# Patient Record
Sex: MALE | Race: WHITE | Employment: FULL TIME | URBAN - METROPOLITAN AREA
[De-identification: names, ages, dates, MRNs, and addresses within clinical notes are randomized per-mention and may not be internally consistent; named-entity substitution may affect disease eponyms.]

---

## 2017-10-11 ENCOUNTER — TELEPHONE (OUTPATIENT)
Dept: INTERNAL MEDICINE | Facility: CLINIC | Age: 57
End: 2017-10-11

## 2018-07-20 ENCOUNTER — HOSPITAL ENCOUNTER (OUTPATIENT)
Facility: CLINIC | Age: 58
End: 2018-07-20
Attending: SURGERY | Admitting: SURGERY
Payer: COMMERCIAL

## 2018-08-30 ENCOUNTER — SURGERY (OUTPATIENT)
Age: 58
End: 2018-08-30

## 2018-08-30 ENCOUNTER — HOSPITAL ENCOUNTER (OUTPATIENT)
Facility: CLINIC | Age: 58
Discharge: HOME | End: 2018-08-30
Attending: INTERNAL MEDICINE | Admitting: INTERNAL MEDICINE
Payer: COMMERCIAL

## 2018-08-30 VITALS
RESPIRATION RATE: 14 BRPM | OXYGEN SATURATION: 94 % | DIASTOLIC BLOOD PRESSURE: 79 MMHG | HEIGHT: 71 IN | SYSTOLIC BLOOD PRESSURE: 111 MMHG | BODY MASS INDEX: 28 KG/M2 | WEIGHT: 200 LBS

## 2018-08-30 LAB — COLONOSCOPY: NORMAL

## 2018-08-30 PROCEDURE — 88305 TISSUE EXAM BY PATHOLOGIST: CPT | Mod: 26 | Performed by: INTERNAL MEDICINE

## 2018-08-30 PROCEDURE — 25000128 H RX IP 250 OP 636: Performed by: INTERNAL MEDICINE

## 2018-08-30 PROCEDURE — 45380 COLONOSCOPY AND BIOPSY: CPT | Mod: PT | Performed by: INTERNAL MEDICINE

## 2018-08-30 PROCEDURE — G0500 MOD SEDAT ENDO SERVICE >5YRS: HCPCS | Performed by: INTERNAL MEDICINE

## 2018-08-30 PROCEDURE — 88305 TISSUE EXAM BY PATHOLOGIST: CPT | Performed by: INTERNAL MEDICINE

## 2018-08-30 RX ORDER — FENTANYL CITRATE 50 UG/ML
INJECTION, SOLUTION INTRAMUSCULAR; INTRAVENOUS PRN
Status: DISCONTINUED | OUTPATIENT
Start: 2018-08-30 | End: 2018-08-30 | Stop reason: HOSPADM

## 2018-08-30 RX ORDER — AMOXICILLIN 500 MG
1200 CAPSULE ORAL DAILY
COMMUNITY

## 2018-08-30 RX ORDER — ONDANSETRON 2 MG/ML
4 INJECTION INTRAMUSCULAR; INTRAVENOUS
Status: DISCONTINUED | OUTPATIENT
Start: 2018-08-30 | End: 2018-08-30 | Stop reason: HOSPADM

## 2018-08-30 RX ORDER — LIDOCAINE 40 MG/G
CREAM TOPICAL
Status: DISCONTINUED | OUTPATIENT
Start: 2018-08-30 | End: 2018-08-30 | Stop reason: HOSPADM

## 2018-08-30 RX ORDER — CYANOCOBALAMIN 1000 UG/ML
1 INJECTION, SOLUTION INTRAMUSCULAR; SUBCUTANEOUS
COMMUNITY
End: 2018-09-13

## 2018-08-30 RX ADMIN — FENTANYL CITRATE 100 MCG: 50 INJECTION, SOLUTION INTRAMUSCULAR; INTRAVENOUS at 09:49

## 2018-08-30 RX ADMIN — MIDAZOLAM 2 MG: 1 INJECTION INTRAMUSCULAR; INTRAVENOUS at 09:51

## 2018-08-30 RX ADMIN — MIDAZOLAM 2 MG: 1 INJECTION INTRAMUSCULAR; INTRAVENOUS at 09:50

## 2018-08-31 LAB — COPATH REPORT: NORMAL

## 2018-09-13 ENCOUNTER — OFFICE VISIT (OUTPATIENT)
Dept: INTERNAL MEDICINE | Facility: CLINIC | Age: 58
End: 2018-09-13
Payer: COMMERCIAL

## 2018-09-13 VITALS
TEMPERATURE: 97.8 F | HEART RATE: 67 BPM | DIASTOLIC BLOOD PRESSURE: 82 MMHG | SYSTOLIC BLOOD PRESSURE: 132 MMHG | BODY MASS INDEX: 28.59 KG/M2 | WEIGHT: 205 LBS | OXYGEN SATURATION: 98 % | RESPIRATION RATE: 16 BRPM

## 2018-09-13 DIAGNOSIS — R07.89 CHEST DISCOMFORT: ICD-10-CM

## 2018-09-13 DIAGNOSIS — Z13.6 CARDIOVASCULAR SCREENING; LDL GOAL LESS THAN 160: ICD-10-CM

## 2018-09-13 DIAGNOSIS — Z12.5 SPECIAL SCREENING FOR MALIGNANT NEOPLASM OF PROSTATE: ICD-10-CM

## 2018-09-13 DIAGNOSIS — R10.13 DYSPEPSIA: Primary | ICD-10-CM

## 2018-09-13 PROCEDURE — 99214 OFFICE O/P EST MOD 30 MIN: CPT | Performed by: INTERNAL MEDICINE

## 2018-09-13 PROCEDURE — 93000 ELECTROCARDIOGRAM COMPLETE: CPT | Performed by: INTERNAL MEDICINE

## 2018-09-13 RX ORDER — CHOLECALCIFEROL (VITAMIN D3) 50 MCG
2000 TABLET ORAL DAILY
COMMUNITY
Start: 2018-09-13

## 2018-09-13 NOTE — PROGRESS NOTES
SUBJECTIVE:   Artis Kuhn is a 58 year old male who presents to clinic today for the following health issues:      Chest Pain      Onset: 3-4 weeks, 6 or so occurances    Description (location/character/radiation/duration): describes as tightness in chest    Intensity:  Lasts for 15 second, describes as discomfort    Never happens during the daytime or when active., always happens when laying down, or when getting up in the morning.     Episodes never last more than 15-30 seconds.      No nausea, no vomiting.     Goes way after getting up and moving, possibly after drinking glass of water, althoguh mostly gone before getting to the sink.     No change sin exercise capacity or abilities.         Accompanying signs and symptoms:        Shortness of breath: YES       Sweating: no        Nausea/vomitting: YES - nausea       Palpitations: no        Other (fevers/chills/cough/heartburn/lightheadedness): YES- lightheadedness. Typically happens at morning or at night after eating, seems to be after drinks/eats dairy. Also has some burning sensation in RUQ of abd    History (similar episodes/previous evaluation): None    Precipitating or alleviating factors:       Worse with exertion: no        Worse with breathing: unknown       Related to eating: YES       Better with burping: no     Therapies tried and outcome: ASA 81 with some relief          Problem list and histories reviewed & adjusted, as indicated.  Additional history: as documented        Reviewed and updated as needed this visit by clinical staff  Tobacco  Allergies  Meds       Reviewed and updated as needed this visit by Provider           Past Medical History:  ---------------------------  Past Medical History:   Diagnosis Date     Obesity 4/27/15    BMI 31       Past Surgical History:  ---------------------------  Past Surgical History:   Procedure Laterality Date     C APPENDECTOMY  1977     COLONOSCOPY N/A 8/30/2018    Procedure: COMBINED  COLONOSCOPY, SINGLE OR MULTIPLE BIOPSY/POLYPECTOMY BY BIOPSY;  colonoscopy;  Surgeon: Issa Soto MD;  Location:  GI     HC AMPUTATE FINGER/THUMB,SINGLE W/WO INTER TFR  1993     HC REPAIR HERNIA WITH MESH  1998       Current Medications:  ---------------------------  Current Outpatient Prescriptions   Medication Sig Dispense Refill     Ascorbic Acid (VITAMIN C PO)        Cyanocobalamin (VITAMIN B 12 PO) Take 1,000 mcg by mouth daily       Omega-3 Fatty Acids (FISH OIL) 1200 MG capsule Take 1,200 mg by mouth daily       VITAMIN D, CHOLECALCIFEROL, PO Take by mouth daily         Allergies:  -------------  Allergies   Allergen Reactions     Carisoprodol Anaphylaxis     soma       Social History:  -------------------  Social History     Social History     Marital status:      Spouse name: N/A     Number of children: N/A     Years of education: N/A     Occupational History     Not on file.     Social History Main Topics     Smoking status: Former Smoker     Years: 10.00     Types: Cigarettes     Quit date: 7/17/1999     Smokeless tobacco: Former User     Quit date: 4/27/2007     Alcohol use Yes      Comment: 2 drinks per 6 month     Drug use: No     Sexual activity: Yes     Partners: Female     Other Topics Concern     Not on file     Social History Narrative       Family Medical History:  ------------------------------  Family History   Problem Relation Age of Onset     Diabetes Mother      Obesity Mother      Circulatory Mother      hx blood clots     Family History Negative Father      healthy     Family History Negative Brother      healthy     Diabetes Sister      hx hepatitis c , overweight         ROS:  REVIEW OF SYSTEMS:    RESP: negative for cough, dyspnea, wheezing, hemoptysis  CV: negative for palpitations, PND, SAM, orthopnea; reports no changes in their ability to perform physical activity (from cardiovascular standpoint)  GI: negative for dysphagia, N/V, pain, melena, diarrhea and  constipation  NEURO: negative for numbness/tingling, paralysis, incoordination, or focal weakness     OBJECTIVE:                                                    /82  Pulse 67  Temp 97.8  F (36.6  C) (Oral)  Resp 16  Wt 205 lb (93 kg)  SpO2 98%  BMI 28.59 kg/m2     GENERAL alert and no distress  EYES:  Normal sclera,conjunctiva, EOMI  HENT: oral and posterior pharynx without lesions or erythema, facies symmetric  NECK: Neck supple. No LAD, without thyroidmegaly or JVD., Carotids without bruits.  RESP: Clear to ausculation bilaterally without wheezes or crackles. Normal BS in all fields.  CV: RRR normal S1S2 without murmurs, rubs or gallops. PMI normal  LYMPH: no cervical lymph adenopathy appreciated  MS: extremities- no gross deformities of the visible extremities noted, no edema  PSYCH: Alert and oriented times 3; speech- coherent  SKIN:  No obvious significant skin lesions on visible portions of face     EKG:  normal sinus rhythm, no ischmie cchanges.      ASSESSMENT/PLAN:                                                      (R10.13) Dyspepsia  (primary encounter diagnosis)  Comment:   Plan: EKG 12-lead complete w/read - Clinics            *  Most likely dyspepsia, acid reflux causing intermittent esophageal spasm.  The episode do not last long enough to likely be heart related.     *  Check for anything related to eating that could be influencing your symptoms, I.e. Watch what you eat, when you eat, how much you eat as these all can cause stomach and esophageal problems.     *  Avoid any mints because this can cause easier acid reflux.     *  Take Prilosec (omeprazole) 20 mg once per day for 2 weeks.      *  If your symptoms do not get noticably better with these interventions or if things worsen in any way, then please let me know    *  Return to see me in 2-3 months for a routine physical, sooner if needed.  Please get fasting labs done at the [facility] or any other [facility]  Lab lab 1-2 days before this appointment.  If you get the labs done at another [facility], make arrangements with them directly.  The orders will be in place.  Eat nothing for at least 8 hours prior to having these labs drawn.  Call [phone] to schedule appointments at [facility].     (X77.47) Chest discomfort  Comment:   Plan: EKG 12-lead complete w/read - Clinics            (Z12.5) Special screening for malignant neoplasm of prostate  Comment:   Plan: **Prostate spec antigen screen FUTURE 1yr            (Z13.6) CARDIOVASCULAR SCREENING; LDL GOAL LESS THAN 160  Comment: Discussed cardiac disease risk factors and cardiac disease risk factor modification.   Plan: Lipid panel reflex to direct LDL Fasting, **CBC        with platelets FUTURE 6mo, **Basic metabolic         panel FUTURE 6mo               See Patient Instructions    BREE HERRERA M.D., MD  [facility]

## 2018-09-13 NOTE — PATIENT INSTRUCTIONS
"*  Most likely dyspepsia, acid reflux causing intermittent esophageal spasm.  The episode do not last long enough to likely be heart related.     *  Check for anything related to eating that could be influencing your symptoms, I.e. Watch what you eat, when you eat, how much you eat as these all can cause stomach and esophageal problems.     *  Avoid any mints because this can cause easier acid reflux.     *  Take Prilosec (omeprazole) 20 mg once per day for 2 weeks.      *  If your symptoms do not get noticably better with these interventions or if things worsen in any way, then please let me know    *  Return to see me in 2-3 months for a routine physical, sooner if needed.  Please get fasting labs done at the [facility] or any other [facility] Lab lab 1-2 days before this appointment.  If you get the labs done at another [facility] [place], make arrangements with them directly.  The orders will be in place.  Eat nothing for at least 8 hours prior to having these labs drawn.  Call [phone] to schedule appointments at [facility].     Gastroesophageal reflux (GERD)/Heartburn:     *  Your symptoms are due to acid from the stomach going up into the esophagus and causing irritation.     *  Anti reflux measures:     --Avoid meals within 3 hours of bedtime.     --Consider using a \"wedge pillow\" to elevated your head while sleeping.      Wedge pillow:  Can get these at any medical supply store or [employer]        *  Many times, this condition is caused by what you eat, when you eat, how you eat, how much you eat.    If there are foods, food combinations or eating patterns that cause your symptoms to be worse, then alter these things to see if things improve.     * Specifically avoid any specific foods that cause problems, such as tomato sauces/onions/peppermints/spearmint.    Avoid OTC medications such as Aspirin , Ibuprofen or Aleve which may cause stomach irritation.     *  Trial of " "Omeprazole (prilosec) either prescription or over the counter version, 20 mg once per day for the next 2 weeks.    *  Try Simethicone (Gas-x, Phazyme, Riopan Plus, Maalox Plus, etc.) as needed for gas and bloating.    If these are not helpful and you continue to have symptoms, then we may want to try prescription medications such as Nexium or Prevacid if these types of medications are covered by your insurance.    Some insurance plans will not cover such prescription medications for stomach acid now that Prilosec is available over the counter.  Some insurance plans may cover these medications if you try OTC Prilosec and it does not work.    *  If you are still having troubles despite this medication, then return to see me, we may need to consider further testing.    *  If the medication works well, then continue it.  Call me to let me know and I can send further presriptions to the pharmacy.             --Good Grains:  Oats, brown rice, Quinoa (these do not raise the blood sugar as much)     --Bad grains:  Anything made from wheat or white rice     (because these raise the blood sugars significantly, and the possible gluten issue from wheat for some people).      --Proteins:  Aim for \"lean proteins\" including chicken, fish, seafood, pork, turkey, and eggs (in moderation); Eat red meat only occasionally      Gennaro Tejada:                    "

## 2018-09-13 NOTE — MR AVS SNAPSHOT
"              After Visit Summary   9/13/2018    Artis Kuhn    MRN: [id number]           Patient Information     Date Of Birth          1960        Visit Information        Provider Department      9/13/2018 10:20 AM Henry Sauer MD [facility]        Today's Diagnoses     Dyspepsia    -  1    Chest discomfort        Special screening for malignant neoplasm of prostate        CARDIOVASCULAR SCREENING; LDL GOAL LESS THAN 160          Care Instructions    *  Most likely dyspepsia, acid reflux causing intermittent esophageal spasm.  The episode do not last long enough to likely be heart related.     *  Check for anything related to eating that could be influencing your symptoms, I.e. Watch what you eat, when you eat, how much you eat as these all can cause stomach and esophageal problems.     *  Avoid any mints because this can cause easier acid reflux.     *  Take Prilosec (omeprazole) 20 mg once per day for 2 weeks.      *  If your symptoms do not get noticably better with these interventions or if things worsen in any way, then please let me know    *  Return to see me in 2-3 months for a routine physical, sooner if needed.  Please get fasting labs done at the [facility] or any other [facility] Lab lab 1-2 days before this appointment.  If you get the labs done at another [facility], make arrangements with them directly.  The orders will be in place.  Eat nothing for at least 8 hours prior to having these labs drawn.  Call [phone] to schedule appointments at [facility].     Gastroesophageal reflux (GERD)/Heartburn:     *  Your symptoms are due to acid from the stomach going up into the esophagus and causing irritation.     *  Anti reflux measures:     --Avoid meals within 3 hours of bedtime.     --Consider using a \"wedge pillow\" to elevated your head while sleeping.      Wedge pillow:  Can get these at any medical supply store or Bed " [employer]        *  Many times, this condition is caused by what you eat, when you eat, how you eat, how much you eat.    If there are foods, food combinations or eating patterns that cause your symptoms to be worse, then alter these things to see if things improve.     * Specifically avoid any specific foods that cause problems, such as tomato sauces/onions/peppermints/spearmint.    Avoid OTC medications such as Aspirin , Ibuprofen or Aleve which may cause stomach irritation.     *  Trial of Omeprazole (prilosec) either prescription or over the counter version, 20 mg once per day for the next 2 weeks.    *  Try Simethicone (Gas-x, Phazyme, Riopan Plus, Maalox Plus, etc.) as needed for gas and bloating.    If these are not helpful and you continue to have symptoms, then we may want to try prescription medications such as Nexium or Prevacid if these types of medications are covered by your insurance.    Some insurance plans will not cover such prescription medications for stomach acid now that Prilosec is available over the counter.  Some insurance plans may cover these medications if you try OTC Prilosec and it does not work.    *  If you are still having troubles despite this medication, then return to see me, we may need to consider further testing.    *  If the medication works well, then continue it.  Call me to let me know and I can send further presriptions to the pharmacy.             --Good Grains:  Oats, brown rice, Quinoa (these do not raise the blood sugar as much)     --Bad grains:  Anything made from wheat or white rice     (because these raise the blood sugars significantly, and the possible gluten issue from wheat for some people).      --Proteins:  Aim for \"lean proteins\" including chicken, fish, seafood, pork, turkey, and eggs (in moderation); Eat red meat only occasionally      Gennaro Tejada:                            Follow-ups after your visit        Future tests that were ordered for you " today     Open Future Orders        Priority Expected Expires Ordered    Lipid panel reflex to direct LDL Fasting Routine 12/1/2018 9/13/2019 9/13/2018    **CBC with platelets FUTURE 6mo Routine 12/1/2018 9/13/2019 9/13/2018    **Basic metabolic panel FUTURE 6mo Routine 12/1/2018 9/13/2019 9/13/2018    **Prostate spec antigen screen FUTURE 1yr Routine 12/1/2018 9/13/2019 9/13/2018            Who to contact     If you have questions or need follow up information about today's clinic visit or your schedule please contact [facility] directly at [phone].  Normal or non-critical lab and imaging results will be communicated to you by MyChart, letter or phone within 4 business days after the clinic has received the results. If you do not hear from us within 7 days, please contact the clinic through MyChart or phone. If you have a critical or abnormal lab result, we will notify you by phone as soon as possible.  Submit refill requests through [employer] or call your pharmacy and they will forward the refill request to us. Please allow 3 business days for your refill to be completed.          Additional Information About Your Visit        Care EveryWhere ID     This is your Care EveryWhere ID. This could be used by other organizations to access your [facility] medical records  [id number]        Your Vitals Were     Pulse Temperature Respirations Pulse Oximetry BMI (Body Mass Index)       67 97.8  F (36.6  C) (Oral) 16 98% 28.59 kg/m2        Blood Pressure from Last 3 Encounters:   09/13/18 132/82   08/30/18 111/79   04/27/15 150/80    Weight from Last 3 Encounters:   09/13/18 205 lb (93 kg)   08/30/18 200 lb (90.7 kg)   04/27/15 206 lb (93.4 kg)              We Performed the Following     EKG 12-lead complete w/read - Clinics        Primary Care Provider Office Phone # Fax #    Henry Sauer [phone]       [address] [place] [address]        Equal Access to  Services     [facility]: Hadii aad ku hadwangjimenez Umagennaro, waelijahda luqadaha, qaybta kakarmamegan luna, huyen beauchamp. So [facility] [phone].    ATENCIÓN: Si habla español, tiene a mcgarry disposición servicios gratuitos de asistencia lingüística. Llame al [phone].    We comply with applicable federal civil rights laws and [place] laws. We do not discriminate on the basis of race, color, national origin, age, disability, sex, sexual orientation, or gender identity.            Thank you!     Thank you for choosing [facility]  for your care. Our goal is always to provide you with excellent care. Hearing back from our patients is one way we can continue to improve our services. Please take a few minutes to complete the written survey that you may receive in the mail after your visit with us. Thank you!             Your Updated Medication List - Protect others around you: Learn how to safely use, store and throw away your medicines at www.disposemymeds.org.          This list is accurate as of 9/13/18 11:22 AM.  Always use your most recent med list.                   Brand Name Dispense Instructions for use Diagnosis    fish Oil 1200 MG capsule      Take 1,200 mg by mouth daily        VITAMIN B 12 PO      Take 1,000 mcg by mouth daily        VITAMIN C PO           * VITAMIN D (CHOLECALCIFEROL) PO      Take by mouth daily        * vitamin D 2000 units tablet      Take 2,000 Units by mouth daily        * Notice:  This list has 2 medication(s) that are the same as other medications prescribed for you. Read the directions carefully, and ask your doctor or other care provider to review them with you.

## 2020-02-11 ENCOUNTER — OFFICE VISIT (OUTPATIENT)
Dept: INTERNAL MEDICINE | Facility: CLINIC | Age: 60
End: 2020-02-11
Payer: COMMERCIAL

## 2020-02-11 VITALS
HEART RATE: 81 BPM | SYSTOLIC BLOOD PRESSURE: 130 MMHG | OXYGEN SATURATION: 98 % | WEIGHT: 182.9 LBS | TEMPERATURE: 97.7 F | BODY MASS INDEX: 25.51 KG/M2 | RESPIRATION RATE: 15 BRPM | DIASTOLIC BLOOD PRESSURE: 80 MMHG

## 2020-02-11 DIAGNOSIS — K40.90 NON-RECURRENT UNILATERAL INGUINAL HERNIA WITHOUT OBSTRUCTION OR GANGRENE: Primary | ICD-10-CM

## 2020-02-11 PROCEDURE — 99214 OFFICE O/P EST MOD 30 MIN: CPT | Performed by: INTERNAL MEDICINE

## 2020-02-11 NOTE — PROGRESS NOTES
Subjective     Artis Kuhn is a 59 year old male who presents to clinic today for the following health issues:    HPI   Normally sees Dr. Sauer    Abdominal Pain      Duration: 2-3 weeks     Description (location/character/radiation): Left lower/ bulging, discomfort / feeling 4-5 different areas that come and go. Rumbling sensation and intermittent very brief pains that move all around only lasting a few seconds         Associated flank pain: None    Intensity:  moderate    Accompanying signs and symptoms:        Fever/Chills: no        Gas/Bloating: no        Nausea/vomitting: no        Diarrhea: no        Dysuria or Hematuria: no     History (previous similar pain/trauma/previous testing): previous hernia repair     Precipitating or alleviating factors:       Pain worse with eating/BM/urination: NO       Pain relieved by BM: no     Therapies tried and outcome: None    LMP:  not applicable       Patient Active Problem List   Diagnosis     Dermatophytosis of nail     Advanced directives, counseling/discussion     Obesity     Past Surgical History:   Procedure Laterality Date     C APPENDECTOMY       COLONOSCOPY N/A 2018    Procedure: COMBINED COLONOSCOPY, SINGLE OR MULTIPLE BIOPSY/POLYPECTOMY BY BIOPSY;  colonoscopy;  Surgeon: Issa Soto MD;  Location: [facility]     HC AMPUTATE FINGER/THUMB,SINGLE W/WO INTER TFR       HC REPAIR HERNIA WITH MESH         Social History     Tobacco Use     Smoking status: Former Smoker     Years: 10.00     Types: Cigarettes     Last attempt to quit: 1999     Years since quittin.5     Smokeless tobacco: Former User     Quit date: 2007   Substance Use Topics     Alcohol use: Yes     Comment: 2 drinks per 6 month     Family History   Problem Relation Age of Onset     Diabetes Mother      Obesity Mother      Circulatory Mother         hx blood clots     Family History Negative Father         healthy     Family History Negative Brother          healthy     Diabetes Sister         hx hepatitis c , overweight         Current Outpatient Medications   Medication Sig Dispense Refill     Ascorbic Acid (VITAMIN C PO)        Cholecalciferol (VITAMIN D) 2000 units tablet Take 2,000 Units by mouth daily       Cyanocobalamin (VITAMIN B 12 PO) Take 1,000 mcg by mouth daily       Omega-3 Fatty Acids (FISH OIL) 1200 MG capsule Take 1,200 mg by mouth daily       VITAMIN D, CHOLECALCIFEROL, PO Take by mouth daily       Allergies   Allergen Reactions     Carisoprodol Anaphylaxis     soma     BP Readings from Last 3 Encounters:   09/13/18 132/82   08/30/18 111/79   04/27/15 150/80    Wt Readings from Last 3 Encounters:   09/13/18 93 kg (205 lb)   08/30/18 90.7 kg (200 lb)   04/27/15 93.4 kg (206 lb)           Reviewed and updated as needed this visit by Provider         Review of Systems   ROS COMP: CONSTITUTIONAL: NEGATIVE for fever, chills, change in weight  EYES: NEGATIVE for vision changes or irritation  ENT/MOUTH: NEGATIVE for ear, mouth and throat problems  RESP: NEGATIVE for significant cough or SOB  CV: NEGATIVE for chest pain, palpitations or peripheral edema  GI: NEGATIVE for nausea, heartburn, or change in bowel habits  : NEGATIVE for frequency, dysuria, or hematuria  MUSCULOSKELETAL: NEGATIVE for significant arthralgias or myalgia  NEURO: NEGATIVE for weakness, dizziness or paresthesias  HEME: NEGATIVE for bleeding problems  PSYCHIATRIC: NEGATIVE for changes in mood or affect      Objective    /80   Pulse 81   Temp 97.7  F (36.5  C) (Oral)   Resp 15   Wt 83 kg (182 lb 14.4 oz)   SpO2 98%   BMI 25.51 kg/m    Body mass index is 25.51 kg/m .  Physical Exam   GENERAL:  alert and no distress  RESP: lungs clear to auscultation - no rales, rhonchi or wheezes  CV: regular rate and rhythm, normal S1 S2, no S3 or S4, no murmur, click or rub, no peripheral edema and peripheral pulses strong  ABDOMEN: soft, no hepatosplenomegaly and bowel sounds normal  There  is chronic scarring noted in the abdominal wall apparently from a prior appendix repair.  The right inguinal ring appears to be intact without any obvious tenderness.  The left inguinal ring is definitely loose with a reducible hernia noted in the supine position.  NEURO: Normal strength and tone, mentation intact and speech normal  PSYCH: mentation appears normal, affect normal/bright        Assessment & Plan     1. Non-recurrent unilateral inguinal hernia without obstruction or gangrene  Suspect symptoms secondary to a left inguinal hernia.  The patient appears clinically stable with no obvious nausea, vomiting, diarrhea or obstructive symptoms.  I have suggested he see 1 of our surgeons for further evaluation and determination of the need for potential repair.  He will make his own appointment and was advised to follow-up if his symptoms worsen.  - GENERAL SURG ADULT REFERRAL; Future       See Patient Instructions    No follow-ups on file.    Misael Mitchell MD  [facility]

## 2020-02-21 ENCOUNTER — TELEPHONE (OUTPATIENT)
Dept: SURGERY | Facility: CLINIC | Age: 60
End: 2020-02-21

## 2020-02-21 ENCOUNTER — OFFICE VISIT (OUTPATIENT)
Dept: SURGERY | Facility: CLINIC | Age: 60
End: 2020-02-21
Attending: INTERNAL MEDICINE
Payer: COMMERCIAL

## 2020-02-21 VITALS
HEART RATE: 82 BPM | HEIGHT: 70 IN | SYSTOLIC BLOOD PRESSURE: 110 MMHG | DIASTOLIC BLOOD PRESSURE: 60 MMHG | WEIGHT: 182 LBS | BODY MASS INDEX: 26.05 KG/M2

## 2020-02-21 DIAGNOSIS — K40.90 NON-RECURRENT UNILATERAL INGUINAL HERNIA WITHOUT OBSTRUCTION OR GANGRENE: ICD-10-CM

## 2020-02-21 PROCEDURE — 99204 OFFICE O/P NEW MOD 45 MIN: CPT | Performed by: SURGERY

## 2020-02-21 RX ORDER — HEPARIN SODIUM 10000 [USP'U]/ML
5000 INJECTION, SOLUTION INTRAVENOUS; SUBCUTANEOUS
Status: CANCELLED | OUTPATIENT
Start: 2020-02-21

## 2020-02-21 ASSESSMENT — MIFFLIN-ST. JEOR: SCORE: 1646.8

## 2020-02-21 NOTE — PROGRESS NOTES
[facility] Consultation    CHIEF COMPLAINT:  Chief Complaint   Patient presents with     Consult     [facility]       HISTORY OF PRESENT ILLNESS:  Artis Kuhn is a 59 year old male who is seen in consultation at the request of Dr. Mitchell for evaluation of left inguinal hernia.  The patient reports that he first noticed some bulging in the left inguinal region approximately 3 weeks ago.  He was able to reduce the majority of bulging but reports that he does still have some bulging present.  The patient does describe ongoing mild discomfort in the left inguinal region.  He denies any obstructive symptoms.  No nausea or vomiting.  He also has been having some intermittent sharp discomfort in the right inguinal region.  He does have history of undergoing a previous open right inguinal hernia repair with placement of mesh.  This was completed when the patient was in his 20s.  Patient denies any bulging of the umbilicus.  Past abdominal surgical history is significant for open appendectomy through a lower midline incision completed for ruptured appendicitis.  Patient denies use of tobacco.  No significant family history of hernias.    REVIEW OF SYSTEMS:  Constitutional: No fevers or chills  Eyes: No blurred or double vision  HENT: Denies headaches, No rhinorrhea, No sore throat  Respiratory: No cough or shortness of breath  Cardiovascular: Denies chest pain or palpitations  Gastrointestinal: No abdominal pain or nausea/vomiting  Genitourinary: No hematuria or dysuria  Musculoskeletal: Denies arthralgias or myalgias  Neurologic: No numbness or tingling  Integumentary: No skin rashes    Past Medical History:   Diagnosis Date     Obesity 4/27/15    BMI 31       Past Surgical History:   Procedure Laterality Date     C APPENDECTOMY  1977     COLONOSCOPY N/A 8/30/2018    Procedure: COMBINED COLONOSCOPY, SINGLE OR MULTIPLE BIOPSY/POLYPECTOMY BY BIOPSY;  colonoscopy;  Surgeon: Issa Soto MD;   "Location: [facility]     HC AMPUTATE FINGER/THUMB,SINGLE W/WO INTER TFR       HC REPAIR HERNIA WITH MESH         Family History   Problem Relation Age of Onset     Diabetes Mother      Obesity Mother      Circulatory Mother         hx blood clots     Family History Negative Father         healthy     Family History Negative Brother         healthy     Diabetes Sister         hx hepatitis c , overweight       Social History     Tobacco Use     Smoking status: Former Smoker     Years: 10.00     Types: Cigarettes     Last attempt to quit: 1999     Years since quittin.6     Smokeless tobacco: Former User     Quit date: 2007   Substance Use Topics     Alcohol use: Yes     Comment: 2 drinks per 6 month       Patient Active Problem List   Diagnosis     Dermatophytosis of nail     Advanced directives, counseling/discussion     Obesity       Allergies   Allergen Reactions     Carisoprodol Anaphylaxis     soma       Current Outpatient Medications   Medication Sig Dispense Refill     Ascorbic Acid (VITAMIN C PO)        Cholecalciferol (VITAMIN D) 2000 units tablet Take 2,000 Units by mouth daily       Cyanocobalamin (VITAMIN B 12 PO) Take 1,000 mcg by mouth daily       Omega-3 Fatty Acids (FISH OIL) 1200 MG capsule Take 1,200 mg by mouth daily       VITAMIN D, CHOLECALCIFEROL, PO Take by mouth daily         Vitals: /60   Pulse 82   Ht 1.778 m (5' 10\")   Wt 82.6 kg (182 lb)   BMI 26.11 kg/m    BMI= Body mass index is 26.11 kg/m .    EXAM:  General: Vital signs reviewed, in no apparent distress  Eyes: Anicteric  HENT: Normocephalic, atraumatic, trachea midline   Respiratory: Breathing nonlabored  Cardiovascular: Regular rate and rhythm  GI: Abdomen soft, nondistended, nontender; moderate sized easily reducible left inguinal hernia, no obvious right inguinal hernia on exam  Musculoskeletal: No gross deformities  Neurologic: Grossly nonfocal exam  Psychiatric: Normal mood, affect and " insight  Integumentary: Warm and dry    ASSESSMENT:  Atris Kuhn is a 59 year old who presents with left inguinal hernia.  Significant pertinent co-morbidities include: None.       PLAN:  Following a thorough discussion with the patient, the decision was made to proceed to the operating room for robotic repair of the patient's left inguinal hernia with placement of mesh.  The patient understands that if we identify a recurrent right inguinal hernia at the time of surgery, this will be repaired with mesh as well.  The risks and benefits of the procedure were discussed with the patient.  Patient will undergo a preoperative history and physical with his primary provider.  Surgery will then be scheduled the patient's earliest convenience.    It was my pleasure to participate in the care of Artis Kuhn in clinic today. Thank you for this consultation.         Mary Hercules MD    Please route or send letter to:  Primary Care Provider (PCP)

## 2020-02-21 NOTE — LETTER
[facility]    [address], [address]  [place], [place] [address]  Phone [phone]  Fax [phone] [address], [address]  [facility] [place] [address]  Phone [phone]  Fax [phone]    [email or web address]     2020    Re: Artis Kuhn  : 1960      [facility] Consultation     CHIEF COMPLAINT:       Chief Complaint   Patient presents with     Consult       [facility]         HISTORY OF PRESENT ILLNESS:  Artis Kuhn is a 59 year old male who is seen in consultation at the request of Dr. Mitchell for evaluation of left inguinal hernia.  The patient reports that he first noticed some bulging in the left inguinal region approximately 3 weeks ago.  He was able to reduce the majority of bulging but reports that he does still have some bulging present.  The patient does describe ongoing mild discomfort in the left inguinal region.  He denies any obstructive symptoms.  No nausea or vomiting.  He also has been having some intermittent sharp discomfort in the right inguinal region.  He does have history of undergoing a previous open right inguinal hernia repair with placement of mesh.  This was completed when the patient was in his 20s.  Patient denies any bulging of the umbilicus.  Past abdominal surgical history is significant for open appendectomy through a lower midline incision completed for ruptured appendicitis.  Patient denies use of tobacco.  No significant family history of hernias.     REVIEW OF SYSTEMS:  Constitutional: No fevers or chills  Eyes: No blurred or double vision  HENT: Denies headaches, No rhinorrhea, No sore throat  Respiratory: No cough or shortness of breath  Cardiovascular: Denies chest pain or palpitations  Gastrointestinal: No abdominal pain or nausea/vomiting  Genitourinary: No hematuria or dysuria  Musculoskeletal: Denies arthralgias or  myalgias  Neurologic: No numbness or tingling  Integumentary: No skin rashes     Past Medical History        Past Medical History:   Diagnosis Date     Obesity 4/27/15     BMI 31            Past Surgical History         Past Surgical History:   Procedure Laterality Date     C APPENDECTOMY        COLONOSCOPY N/A 2018     Procedure: COMBINED COLONOSCOPY, SINGLE OR MULTIPLE BIOPSY/POLYPECTOMY BY BIOPSY;  colonoscopy;  Surgeon: Issa Soto MD;  Location: [facility]     HC AMPUTATE FINGER/THUMB,SINGLE W/WO INTER TFR        HC REPAIR HERNIA WITH MESH               Family History         Family History   Problem Relation Age of Onset     Diabetes Mother       Obesity Mother       Circulatory Mother           hx blood clots     Family History Negative Father           healthy     Family History Negative Brother           healthy     Diabetes Sister           hx hepatitis c , overweight            Social History            Tobacco Use     Smoking status: Former Smoker       Years: 10.00       Types: Cigarettes       Last attempt to quit: 1999       Years since quittin.6     Smokeless tobacco: Former User       Quit date: 2007   Substance Use Topics     Alcohol use: Yes       Comment: 2 drinks per 6 month             Patient Active Problem List   Diagnosis     Dermatophytosis of nail     Advanced directives, counseling/discussion     Obesity               Allergies   Allergen Reactions     Carisoprodol Anaphylaxis       soma         Current Outpatient Prescriptions          Current Outpatient Medications   Medication Sig Dispense Refill     Ascorbic Acid (VITAMIN C PO)           Cholecalciferol (VITAMIN D) 2000 units tablet Take 2,000 Units by mouth daily         Cyanocobalamin (VITAMIN B 12 PO) Take 1,000 mcg by mouth daily         Omega-3 Fatty Acids (FISH OIL) 1200 MG capsule Take 1,200 mg by mouth daily         VITAMIN D, CHOLECALCIFEROL, PO Take by mouth daily                Vitals:  "/60   Pulse 82   Ht 1.778 m (5' 10\")   Wt 82.6 kg (182 lb)   BMI 26.11 kg/m    BMI= Body mass index is 26.11 kg/m .     EXAM:  General: Vital signs reviewed, in no apparent distress  Eyes: Anicteric  HENT: Normocephalic, atraumatic, trachea midline   Respiratory: Breathing nonlabored  Cardiovascular: Regular rate and rhythm  GI: Abdomen soft, nondistended, nontender; moderate sized easily reducible left inguinal hernia, no obvious right inguinal hernia on exam  Musculoskeletal: No gross deformities  Neurologic: Grossly nonfocal exam  Psychiatric: Normal mood, affect and insight  Integumentary: Warm and dry     ASSESSMENT:  Artis Kuhn is a 59 year old who presents with left inguinal hernia.  Significant pertinent co-morbidities include: None.         PLAN:  Following a thorough discussion with the patient, the decision was made to proceed to the operating room for robotic repair of the patient's left inguinal hernia with placement of mesh.  The patient understands that if we identify a recurrent right inguinal hernia at the time of surgery, this will be repaired with mesh as well.  The risks and benefits of the procedure were discussed with the patient.  Patient will undergo a preoperative history and physical with his primary provider.  Surgery will then be scheduled the patient's earliest convenience.     It was my pleasure to participate in the care of Artis Kuhn in clinic today. Thank you for this consultation.            Mary Hercules MD       "

## 2020-02-21 NOTE — TELEPHONE ENCOUNTER
Type of surgery: Robotic assisted left inguinal hernia repair, possible bilateral  Location of surgery: [facility]  Date and time of surgery: 3/12/20 at 8am  Surgeon: Dr. Mary Hercules  Pre-Op Appt Date: Patient to schedule  Post-Op Appt Date: Patient to schedule   Packet sent out: Yes  Pre-cert/Authorization completed:  Not Applicable  Date: 2/21/20

## 2020-03-12 ENCOUNTER — ANESTHESIA (OUTPATIENT)
Dept: SURGERY | Facility: CLINIC | Age: 60
End: 2020-03-12
Payer: COMMERCIAL

## 2020-03-12 ENCOUNTER — APPOINTMENT (OUTPATIENT)
Dept: SURGERY | Facility: PHYSICIAN GROUP | Age: 60
End: 2020-03-12
Payer: COMMERCIAL

## 2020-03-12 ENCOUNTER — SURGERY (OUTPATIENT)
Age: 60
End: 2020-03-12
Payer: COMMERCIAL

## 2020-03-12 ENCOUNTER — ANESTHESIA EVENT (OUTPATIENT)
Dept: SURGERY | Facility: CLINIC | Age: 60
End: 2020-03-12
Payer: COMMERCIAL

## 2020-03-12 ENCOUNTER — HOSPITAL ENCOUNTER (OUTPATIENT)
Facility: CLINIC | Age: 60
Discharge: HOME | End: 2020-03-12
Attending: SURGERY | Admitting: SURGERY
Payer: COMMERCIAL

## 2020-03-12 VITALS
WEIGHT: 177.6 LBS | TEMPERATURE: 96.3 F | HEART RATE: 58 BPM | DIASTOLIC BLOOD PRESSURE: 95 MMHG | RESPIRATION RATE: 15 BRPM | HEIGHT: 70 IN | SYSTOLIC BLOOD PRESSURE: 171 MMHG | OXYGEN SATURATION: 92 % | BODY MASS INDEX: 25.43 KG/M2

## 2020-03-12 DIAGNOSIS — K40.90 NON-RECURRENT UNILATERAL INGUINAL HERNIA WITHOUT OBSTRUCTION OR GANGRENE: ICD-10-CM

## 2020-03-12 PROCEDURE — 36000087 ZZH SURGERY LEVEL 8 EA 15 ADDTL MIN: Performed by: SURGERY

## 2020-03-12 PROCEDURE — 25000566 ZZH SEVOFLURANE, EA 15 MIN: Performed by: SURGERY

## 2020-03-12 PROCEDURE — 36000085 ZZH SURGERY LEVEL 8 1ST 30 MIN: Performed by: SURGERY

## 2020-03-12 PROCEDURE — 40000169 ZZH STATISTIC PRE-PROCEDURE ASSESSMENT I: Performed by: SURGERY

## 2020-03-12 PROCEDURE — 71000027 ZZH RECOVERY PHASE 2 EACH 15 MINS: Performed by: SURGERY

## 2020-03-12 PROCEDURE — 25000125 ZZHC RX 250: Performed by: SURGERY

## 2020-03-12 PROCEDURE — 49650 LAP ING HERNIA REPAIR INIT: CPT | Mod: AS | Performed by: PHYSICIAN ASSISTANT

## 2020-03-12 PROCEDURE — 37000008 ZZH ANESTHESIA TECHNICAL FEE, 1ST 30 MIN: Performed by: SURGERY

## 2020-03-12 PROCEDURE — 25000128 H RX IP 250 OP 636: Performed by: SURGERY

## 2020-03-12 PROCEDURE — 25000128 H RX IP 250 OP 636: Performed by: ANESTHESIOLOGY

## 2020-03-12 PROCEDURE — 27210794 ZZH OR GENERAL SUPPLY STERILE: Performed by: SURGERY

## 2020-03-12 PROCEDURE — C1781 MESH (IMPLANTABLE): HCPCS | Performed by: SURGERY

## 2020-03-12 PROCEDURE — 71000012 ZZH RECOVERY PHASE 1 LEVEL 1 FIRST HR: Performed by: SURGERY

## 2020-03-12 PROCEDURE — 71000013 ZZH RECOVERY PHASE 1 LEVEL 1 EA ADDTL HR: Performed by: SURGERY

## 2020-03-12 PROCEDURE — 37000009 ZZH ANESTHESIA TECHNICAL FEE, EACH ADDTL 15 MIN: Performed by: SURGERY

## 2020-03-12 PROCEDURE — S2900 ROBOTIC SURGICAL SYSTEM: HCPCS | Performed by: SURGERY

## 2020-03-12 PROCEDURE — 25000128 H RX IP 250 OP 636: Performed by: NURSE ANESTHETIST, CERTIFIED REGISTERED

## 2020-03-12 PROCEDURE — 25000125 ZZHC RX 250: Performed by: NURSE ANESTHETIST, CERTIFIED REGISTERED

## 2020-03-12 PROCEDURE — 25000132 ZZH RX MED GY IP 250 OP 250 PS 637: Performed by: SURGERY

## 2020-03-12 PROCEDURE — 25800030 ZZH RX IP 258 OP 636: Performed by: NURSE ANESTHETIST, CERTIFIED REGISTERED

## 2020-03-12 PROCEDURE — 49650 LAP ING HERNIA REPAIR INIT: CPT | Mod: LT | Performed by: SURGERY

## 2020-03-12 DEVICE — IMPLANTABLE DEVICE: Type: IMPLANTABLE DEVICE | Site: INGUINAL | Status: FUNCTIONAL

## 2020-03-12 RX ORDER — FENTANYL CITRATE 50 UG/ML
25-50 INJECTION, SOLUTION INTRAMUSCULAR; INTRAVENOUS
Status: DISCONTINUED | OUTPATIENT
Start: 2020-03-12 | End: 2020-03-12 | Stop reason: HOSPADM

## 2020-03-12 RX ORDER — PROPOFOL 10 MG/ML
INJECTION, EMULSION INTRAVENOUS PRN
Status: DISCONTINUED | OUTPATIENT
Start: 2020-03-12 | End: 2020-03-12

## 2020-03-12 RX ORDER — OXYCODONE HYDROCHLORIDE 5 MG/1
5 TABLET ORAL EVERY 4 HOURS PRN
Qty: 15 TABLET | Refills: 0 | Status: SHIPPED | OUTPATIENT
Start: 2020-03-12

## 2020-03-12 RX ORDER — HEPARIN SODIUM 5000 [USP'U]/.5ML
5000 INJECTION, SOLUTION INTRAVENOUS; SUBCUTANEOUS
Status: COMPLETED | OUTPATIENT
Start: 2020-03-12 | End: 2020-03-12

## 2020-03-12 RX ORDER — FENTANYL CITRATE 50 UG/ML
INJECTION, SOLUTION INTRAMUSCULAR; INTRAVENOUS PRN
Status: DISCONTINUED | OUTPATIENT
Start: 2020-03-12 | End: 2020-03-12

## 2020-03-12 RX ORDER — SODIUM CHLORIDE, SODIUM LACTATE, POTASSIUM CHLORIDE, CALCIUM CHLORIDE 600; 310; 30; 20 MG/100ML; MG/100ML; MG/100ML; MG/100ML
INJECTION, SOLUTION INTRAVENOUS CONTINUOUS PRN
Status: DISCONTINUED | OUTPATIENT
Start: 2020-03-12 | End: 2020-03-12

## 2020-03-12 RX ORDER — HYDROMORPHONE HYDROCHLORIDE 1 MG/ML
.3-.5 INJECTION, SOLUTION INTRAMUSCULAR; INTRAVENOUS; SUBCUTANEOUS EVERY 5 MIN PRN
Status: DISCONTINUED | OUTPATIENT
Start: 2020-03-12 | End: 2020-03-12 | Stop reason: HOSPADM

## 2020-03-12 RX ORDER — SODIUM CHLORIDE, SODIUM LACTATE, POTASSIUM CHLORIDE, CALCIUM CHLORIDE 600; 310; 30; 20 MG/100ML; MG/100ML; MG/100ML; MG/100ML
INJECTION, SOLUTION INTRAVENOUS CONTINUOUS
Status: DISCONTINUED | OUTPATIENT
Start: 2020-03-12 | End: 2020-03-12 | Stop reason: HOSPADM

## 2020-03-12 RX ORDER — ONDANSETRON 4 MG/1
4 TABLET, ORALLY DISINTEGRATING ORAL EVERY 30 MIN PRN
Status: DISCONTINUED | OUTPATIENT
Start: 2020-03-12 | End: 2020-03-12 | Stop reason: HOSPADM

## 2020-03-12 RX ORDER — LIDOCAINE HYDROCHLORIDE 20 MG/ML
INJECTION, SOLUTION INFILTRATION; PERINEURAL PRN
Status: DISCONTINUED | OUTPATIENT
Start: 2020-03-12 | End: 2020-03-12

## 2020-03-12 RX ORDER — CEFAZOLIN SODIUM 2 G/100ML
2 INJECTION, SOLUTION INTRAVENOUS
Status: COMPLETED | OUTPATIENT
Start: 2020-03-12 | End: 2020-03-12

## 2020-03-12 RX ORDER — AMOXICILLIN 250 MG
1-2 CAPSULE ORAL 2 TIMES DAILY
Qty: 30 TABLET | Refills: 0 | Status: SHIPPED | OUTPATIENT
Start: 2020-03-12

## 2020-03-12 RX ORDER — NALOXONE HYDROCHLORIDE 0.4 MG/ML
.1-.4 INJECTION, SOLUTION INTRAMUSCULAR; INTRAVENOUS; SUBCUTANEOUS
Status: DISCONTINUED | OUTPATIENT
Start: 2020-03-12 | End: 2020-03-12 | Stop reason: HOSPADM

## 2020-03-12 RX ORDER — NEOSTIGMINE METHYLSULFATE 1 MG/ML
VIAL (ML) INJECTION PRN
Status: DISCONTINUED | OUTPATIENT
Start: 2020-03-12 | End: 2020-03-12

## 2020-03-12 RX ORDER — CEFAZOLIN SODIUM 1 G/3ML
1 INJECTION, POWDER, FOR SOLUTION INTRAMUSCULAR; INTRAVENOUS SEE ADMIN INSTRUCTIONS
Status: DISCONTINUED | OUTPATIENT
Start: 2020-03-12 | End: 2020-03-12 | Stop reason: HOSPADM

## 2020-03-12 RX ORDER — ONDANSETRON 2 MG/ML
4 INJECTION INTRAMUSCULAR; INTRAVENOUS EVERY 30 MIN PRN
Status: DISCONTINUED | OUTPATIENT
Start: 2020-03-12 | End: 2020-03-12 | Stop reason: HOSPADM

## 2020-03-12 RX ORDER — BUPIVACAINE HYDROCHLORIDE AND EPINEPHRINE 5; 5 MG/ML; UG/ML
INJECTION, SOLUTION PERINEURAL PRN
Status: DISCONTINUED | OUTPATIENT
Start: 2020-03-12 | End: 2020-03-12 | Stop reason: HOSPADM

## 2020-03-12 RX ORDER — OXYCODONE HYDROCHLORIDE 5 MG/1
5 TABLET ORAL
Status: COMPLETED | OUTPATIENT
Start: 2020-03-12 | End: 2020-03-12

## 2020-03-12 RX ORDER — LABETALOL HYDROCHLORIDE 5 MG/ML
INJECTION, SOLUTION INTRAVENOUS PRN
Status: DISCONTINUED | OUTPATIENT
Start: 2020-03-12 | End: 2020-03-12

## 2020-03-12 RX ORDER — VECURONIUM BROMIDE 1 MG/ML
INJECTION, POWDER, LYOPHILIZED, FOR SOLUTION INTRAVENOUS PRN
Status: DISCONTINUED | OUTPATIENT
Start: 2020-03-12 | End: 2020-03-12

## 2020-03-12 RX ORDER — ONDANSETRON 2 MG/ML
INJECTION INTRAMUSCULAR; INTRAVENOUS PRN
Status: DISCONTINUED | OUTPATIENT
Start: 2020-03-12 | End: 2020-03-12

## 2020-03-12 RX ORDER — DEXAMETHASONE SODIUM PHOSPHATE 4 MG/ML
INJECTION, SOLUTION INTRA-ARTICULAR; INTRALESIONAL; INTRAMUSCULAR; INTRAVENOUS; SOFT TISSUE PRN
Status: DISCONTINUED | OUTPATIENT
Start: 2020-03-12 | End: 2020-03-12

## 2020-03-12 RX ORDER — GLYCOPYRROLATE 0.2 MG/ML
INJECTION, SOLUTION INTRAMUSCULAR; INTRAVENOUS PRN
Status: DISCONTINUED | OUTPATIENT
Start: 2020-03-12 | End: 2020-03-12

## 2020-03-12 RX ADMIN — GLYCOPYRROLATE 0.6 MG: 0.2 INJECTION, SOLUTION INTRAMUSCULAR; INTRAVENOUS at 09:56

## 2020-03-12 RX ADMIN — OXYCODONE HYDROCHLORIDE 5 MG: 5 TABLET ORAL at 11:21

## 2020-03-12 RX ADMIN — SODIUM CHLORIDE, POTASSIUM CHLORIDE, SODIUM LACTATE AND CALCIUM CHLORIDE: 600; 310; 30; 20 INJECTION, SOLUTION INTRAVENOUS at 07:57

## 2020-03-12 RX ADMIN — SODIUM CHLORIDE, POTASSIUM CHLORIDE, SODIUM LACTATE AND CALCIUM CHLORIDE: 600; 310; 30; 20 INJECTION, SOLUTION INTRAVENOUS at 09:22

## 2020-03-12 RX ADMIN — FENTANYL CITRATE 100 MCG: 50 INJECTION, SOLUTION INTRAMUSCULAR; INTRAVENOUS at 08:01

## 2020-03-12 RX ADMIN — LIDOCAINE HYDROCHLORIDE 100 MG: 20 INJECTION, SOLUTION INFILTRATION; PERINEURAL at 08:01

## 2020-03-12 RX ADMIN — DEXAMETHASONE SODIUM PHOSPHATE 4 MG: 4 INJECTION, SOLUTION INTRA-ARTICULAR; INTRALESIONAL; INTRAMUSCULAR; INTRAVENOUS; SOFT TISSUE at 08:18

## 2020-03-12 RX ADMIN — VECURONIUM BROMIDE 2 MG: 1 INJECTION, POWDER, LYOPHILIZED, FOR SOLUTION INTRAVENOUS at 08:46

## 2020-03-12 RX ADMIN — BUPIVACAINE HYDROCHLORIDE AND EPINEPHRINE BITARTRATE 30 ML: 5; .005 INJECTION, SOLUTION PERINEURAL at 09:55

## 2020-03-12 RX ADMIN — HEPARIN SODIUM 5000 UNITS: 5000 INJECTION, SOLUTION INTRAVENOUS; SUBCUTANEOUS at 08:26

## 2020-03-12 RX ADMIN — ONDANSETRON 4 MG: 2 INJECTION INTRAMUSCULAR; INTRAVENOUS at 09:56

## 2020-03-12 RX ADMIN — NEOSTIGMINE METHYLSULFATE 4 MG: 1 INJECTION, SOLUTION INTRAVENOUS at 09:56

## 2020-03-12 RX ADMIN — HEPARIN SODIUM 5000 UNITS: 5000 INJECTION, SOLUTION INTRAVENOUS; SUBCUTANEOUS at 07:01

## 2020-03-12 RX ADMIN — LABETALOL HYDROCHLORIDE 5 MG: 5 INJECTION INTRAVENOUS at 09:22

## 2020-03-12 RX ADMIN — ROCURONIUM BROMIDE 50 MG: 10 INJECTION INTRAVENOUS at 08:01

## 2020-03-12 RX ADMIN — CEFAZOLIN SODIUM 2 G: 2 INJECTION, SOLUTION INTRAVENOUS at 08:10

## 2020-03-12 RX ADMIN — LABETALOL HYDROCHLORIDE 5 MG: 5 INJECTION INTRAVENOUS at 09:01

## 2020-03-12 RX ADMIN — DEXMEDETOMIDINE HYDROCHLORIDE 12 MCG: 100 INJECTION, SOLUTION INTRAVENOUS at 08:39

## 2020-03-12 RX ADMIN — HYDROMORPHONE HYDROCHLORIDE 0.5 MG: 1 INJECTION, SOLUTION INTRAMUSCULAR; INTRAVENOUS; SUBCUTANEOUS at 08:32

## 2020-03-12 RX ADMIN — LABETALOL HYDROCHLORIDE 5 MG: 5 INJECTION INTRAVENOUS at 09:09

## 2020-03-12 RX ADMIN — PHENYLEPHRINE HYDROCHLORIDE 100 MCG: 10 INJECTION INTRAVENOUS at 08:15

## 2020-03-12 RX ADMIN — VECURONIUM BROMIDE 1 MG: 1 INJECTION, POWDER, LYOPHILIZED, FOR SOLUTION INTRAVENOUS at 09:43

## 2020-03-12 RX ADMIN — WATER 1000 ML: 100 IRRIGANT IRRIGATION at 08:29

## 2020-03-12 RX ADMIN — MIDAZOLAM 2 MG: 1 INJECTION INTRAMUSCULAR; INTRAVENOUS at 07:57

## 2020-03-12 RX ADMIN — LABETALOL HYDROCHLORIDE 5 MG: 5 INJECTION INTRAVENOUS at 09:40

## 2020-03-12 RX ADMIN — PROPOFOL 200 MG: 10 INJECTION, EMULSION INTRAVENOUS at 08:01

## 2020-03-12 RX ADMIN — FENTANYL CITRATE 50 MCG: 0.05 INJECTION, SOLUTION INTRAMUSCULAR; INTRAVENOUS at 11:01

## 2020-03-12 ASSESSMENT — MIFFLIN-ST. JEOR: SCORE: 1626.84

## 2020-03-12 ASSESSMENT — LIFESTYLE VARIABLES: TOBACCO_USE: 1

## 2020-03-12 NOTE — ANESTHESIA POSTPROCEDURE EVALUATION
Patient: Artis Kuhn    Procedure(s):  ROBOTIC REPAIR OF LEFT INGUINAL HERNIA WITH PLACEMENT OF DEXTILE  (ANATOMICAL MESH PRE SHAPED MONOFILAMENT POLYPROPYLENE TEXTILE WITH MARKING) LEFT SIDE     Diagnosis:Non-recurrent unilateral inguinal hernia without obstruction or gangrene [K40.90]    Anesthesia Type:  General    Note:  Anesthesia Post Evaluation    Patient location during evaluation: PACU  Patient participation: Able to fully participate in evaluation  Level of consciousness: awake and alert  Pain management: adequate  Airway patency: patent  Cardiovascular status: acceptable and hemodynamically stable  Respiratory status: acceptable and unassisted  Hydration status: acceptable  PONV: none             Last vitals:  Vitals:    03/12/20 1110 03/12/20 1115 03/12/20 1121   BP: 133/89 133/89    Pulse:  58    Resp: 16 20 16   Temp: 35.8  C (96.4  F) 35.7  C (96.3  F)    SpO2: 95% 92%          Electronically Signed By: Javier Doss DO  March 12, 2020  12:07 PM

## 2020-03-12 NOTE — ANESTHESIA CARE TRANSFER NOTE
Patient: Artis Kuhn    Procedure(s):  ROBOTIC REPAIR OF LEFT INGUINAL HERNIA WITH PLACEMENT OF DEXTILE  (ANATOMICAL MESH PRE SHAPED MONOFILAMENT POLYPROPYLENE TEXTILE WITH MARKING) LEFT SIDE     Diagnosis: Non-recurrent unilateral inguinal hernia without obstruction or gangrene [K40.90]  Diagnosis Additional Information: No value filed.    Anesthesia Type:   General     Note:  Airway :Face Mask  Patient transferred to:PACU  Comments: Neuromuscular blockade reversed after TOF 4/4, spontaneous respirations, adequate tidal volumes, followed commands to voice, extubated atraumatically, extubated with suction, airway patent after extubation.  Oxygen via facemask at 6 liters per minute to PACU. Oxygen tubing connected to wall O2 in PACU, SpO2, NiBP, and EKG monitors and alarms on and functioning, Edison Hugger warmer connected to patient gown, report on patient's clinical status given to PACU RN, RN questions answered.   Handoff Report: Identifed the Patient, Identified the Reponsible Provider, Reviewed the pertinent medical history, Discussed the surgical course, Reviewed Intra-OP anesthesia mangement and issues during anesthesia, Set expectations for post-procedure period and Allowed opportunity for questions and acknowledgement of understanding      Vitals: (Last set prior to Anesthesia Care Transfer)    CRNA VITALS  3/12/2020 0944 - 3/12/2020 1014      3/12/2020             Resp Rate (set):  10                Electronically Signed By: MARSHA Ohara CRNA  March 12, 2020  10:14 AM

## 2020-03-12 NOTE — OP NOTE
Procedure Date: 03/12/2020      STAFF SURGEON:  Mary Hercules MD      ASSISTANT:  Tianna Gonzalez PA-C      PREOPERATIVE DIAGNOSES:  Left inguinal hernia, possible recurrent right inguinal hernia.      POSTOPERATIVE DIAGNOSIS:  Indirect left inguinal hernia.      PROCEDURES PERFORMED:   1.  Robotic lysis of adhesions.   2.  Robotic repair of indirect left inguinal hernia with placement of preperitoneal mesh.      INDICATION FOR OPERATION:  Santino is a 59-year-old male who presented to the Surgery Clinic with complaints of bulging in the left inguinal region with associated mild discomfort.  The patient was found on examination to have evidence of a reducible left inguinal hernia.  No obvious evidence of a right inguinal hernia on examination.  He had undergone a previous open repair of a right inguinal hernia with placement of mesh.  The decision was made to proceed to the operating room for a robotic repair of the patient's left inguinal hernia with placement of mesh.  The risks and benefits of the procedure were discussed with the patient and consent for the procedure was obtained.      ANESTHESIA:  General.      DESCRIPTION OF PROCEDURE:  The patient was brought to the preoperative area.  Preoperative antibiotics were administered.  The patient's surgical site was marked with a permanent marking pen.  The patient was brought to the operating room and placed in the supine position on the operating table.  A timeout was completed.  Anesthesia was induced, and the patient was intubated.  The patient was secured to the operating table, and his pressure points were padded.  The patient's abdomen was prepped and draped in a sterile fashion.  Following infiltration with local anesthetic, the 11 blade scalpel was used to make a small incision overlying the patient's left upper quadrant.  Entrance into the abdomen was then gained under direct visualization using the 5 mm Visiport and a 5 mm laparoscope.  When the patient's  abdomen was safely entered, it was fully insufflated.  The laparoscope was reinserted into the abdominal cavity and no evidence of injury was noted at the port entry site.  Under direct visualization, 2 additional 8 mm robotic ports were placed, 1 in the subxiphoid position and 1 in the right upper quadrant.  The patient's left upper quadrant 5 mm port was then upsized to an 8 mm robotic port.  The robot was brought onto the field and docked.  The robotic fenestrated grasper and robotic scissors were introduced into the abdominal cavity.  The patient did have several loops of small bowel that were adherent to the anterior abdominal wall due to his previous operations.  These were carefully taken down utilizing the robotic scissors.  No enterotomies were created.  There was some serosal denuding overlying one of the loops of small bowel, and this area was oversewn using a figure-of-eight Lembert 3-0 silk suture.  Once the small bowel adhesions had been lysed, the patient was then placed into the Trendelenburg position.  Both inguinal areas were inspected.  The patient had no obvious evidence of a recurrent right inguinal hernia.  On the left side, the patient had evidence of a moderate-sized indirect inguinal hernia.  The patient's peritoneum was incised on the left side from the level of the abdominal midline to the level of the anterior superior iliac spine.  The patient's preperitoneal space was then developed utilizing blunt dissection,  the electrocautery, and the robotic scissors.  We dissected down to Rubén's ligament medially.  We then continued our dissection in a medial to lateral fashion.  The patient's indirect inguinal hernia sac was carefully  from the cord structures, utilizing the electrocautery.  The patient also had a moderate-sized cord lipoma that was carefully dissected free from the cord structures and reduced into the preperitoneal space.  We then continued our dissection out  laterally to accommodate placement of the mesh.  The patient's peritoneal edge was then swept inferiorly along the length of the preperitoneal pocket.  We then selected a piece of large left-sided Dextile mesh, which was inserted into the abdominal cavity and placed in our preperitoneal pocket with excellent coverage of all potential hernia defects.  The mesh was sutured into place medially at Rubén's ligament and lateral to the epigastric vessels using interrupted Vicryl sutures.  The peritoneum was then reapproximated over the top of the mesh utilizing a running 3-0 Stratafix suture.  The patient's indirect inguinal hernia sac and cord lipoma were incorporated into our peritoneal closure to keep them from slipping back into the inguinal canal.  The patient's surgical site was inspected and found to be hemostatic.  The small bowel that was taken down during the previous lysis of adhesions was inspected and found to be without evidence of abnormality.  The remaining needles and suture material were removed from the abdominal cavity under direct visualization.  The robotic equipment was removed from the abdominal cavity.  The robot was then undocked.  The patient's 8 mm ports were removed, and the patient's abdomen was allowed to desufflate fully.  The port sites were inspected and hemostasis was ensured.  The patient's wounds were then reapproximated with 4-0 Monocryl subcuticular stitches.  The incisions were washed and dried and skin glue was applied.  The lap, needle, and instrument counts were correct at the end of the procedure.  The patient tolerated the procedure well and was extubated and taken to the recovery area in stable condition.     Tianna Gonzalez PA-C assisted in the above procedure. They provided assistance with pre-operative positioning, prepping, and draping of the patient. The assistant provided vital operative assistance with retraction using instruments thus providing the necessary exposure and  visualization for the case, manipulation of tissues to achieve hemostasis, and assisted in closure of the wound. Post-operatively they assisted in transfer of the patient off the operative table and transition to the PACU physicians.       ESTIMATED BLOOD LOSS:  10 mL.      FINDINGS:  No evidence of recurrent right inguinal hernia.  Moderate-sized indirect left inguinal hernia repaired with sac reduction and placement of large Dextile preperitoneal mesh.      COMPLICATIONS:  None.      SPECIMENS:  None.      DRAINS:  None.      CONDITION:  The patient will be discharged to home directly from the postanesthesia care unit with instructions for postoperative cares and medications for pain management.         CARMELO SEALS MD             D: 2020   T: 2020   MT: JANETT      Name:     NYDIA BALL   MRN:      [id number]        Account:        [id number]   :      1960           Procedure Date: 2020      Document: [id number]

## 2020-03-12 NOTE — ANESTHESIA PREPROCEDURE EVALUATION
Anesthesia Pre-Procedure Evaluation    Patient: Artis Kuhn   MRN: [id number] : 1960          Preoperative Diagnosis: Non-recurrent unilateral inguinal hernia without obstruction or gangrene [K40.90]    Procedure(s):  ROBOTIC REPAIR OF LEFT INGUINAL HERNIA WITH PLACEMENT OF MESH.  POSSIBLE ROBOTIC REPAIR OF RECURRENT  RIGHT INGUINAL HERNIA WITH PLACEMENT OF MESH    Past Medical History:   Diagnosis Date     Obesity 4/27/15    BMI 31     Past Surgical History:   Procedure Laterality Date     C APPENDECTOMY       COLONOSCOPY N/A 2018    Procedure: COMBINED COLONOSCOPY, SINGLE OR MULTIPLE BIOPSY/POLYPECTOMY BY BIOPSY;  colonoscopy;  Surgeon: Issa Soto MD;  Location: [facility]     HC AMPUTATE FINGER/THUMB,SINGLE W/WO INTER TFR       HC REPAIR HERNIA WITH MESH         Anesthesia Evaluation     . Pt has had prior anesthetic.     No history of anesthetic complications          ROS/MED HX    ENT/Pulmonary:     (+)tobacco use, Past use , . .    Neurologic:       Cardiovascular: Comment: Occasional atypical chest pain;          METS/Exercise Tolerance:  >4 METS   Hematologic:         Musculoskeletal:         GI/Hepatic:     (+) Other GI/Hepatic left inguinal hernia      Renal/Genitourinary:         Endo:     (+) Obesity, .      Psychiatric:         Infectious Disease:         Malignancy:         Other:                          Physical Exam  Normal systems: cardiovascular, pulmonary and dental    Airway   Mallampati: II  TM distance: >3 FB  Neck ROM: full    Dental     Cardiovascular       Pulmonary             Lab Results   Component Value Date    WBC 7.2 2002    HGB 16.1 2015    HCT 46.8 2002     2015    POTASSIUM 4.1 2015    CHLORIDE 106 2015    CO2 27 2015    BUN 14 2015    CR 0.97 2015    GLC 89 2015    TIERRA 9.3 2015    ALT 37 2002    T4 1.05 2002       Preop Vitals  BP Readings from Last 3  "Encounters:   03/12/20 132/79   02/21/20 110/60   02/11/20 130/80    Pulse Readings from Last 3 Encounters:   03/12/20 67   02/21/20 82   02/11/20 81      Resp Readings from Last 3 Encounters:   03/12/20 16   02/11/20 15   09/13/18 16    SpO2 Readings from Last 3 Encounters:   03/12/20 100%   02/11/20 98%   09/13/18 98%      Temp Readings from Last 1 Encounters:   03/12/20 36.4  C (97.5  F) (Temporal)    Ht Readings from Last 1 Encounters:   03/12/20 1.778 m (5' 10\")      Wt Readings from Last 1 Encounters:   03/12/20 80.6 kg (177 lb 9.6 oz)    Estimated body mass index is 25.48 kg/m  as calculated from the following:    Height as of this encounter: 1.778 m (5' 10\").    Weight as of this encounter: 80.6 kg (177 lb 9.6 oz).       Anesthesia Plan      History & Physical Review  History and physical reviewed and following examination; no interval change.    ASA Status:  2 .    NPO Status:  > 8 hours    Plan for General (ETT) with Intravenous and Propofol induction. Maintenance will be Balanced.    PONV prophylaxis:  Ondansetron (or other 5HT-3) and Dexamethasone or Solumedrol         Postoperative Care  Postoperative pain management:  IV analgesics and Multi-modal analgesia.      Consents  Anesthetic plan, risks, benefits and alternatives discussed with:  Patient.  Use of blood products discussed: Yes.   Use of blood products discussed with Patient.  Consented to blood products.  .                 Javier Doss, DO  "

## 2020-03-12 NOTE — BRIEF OP NOTE
[facility]    Brief Operative Note    Pre-operative diagnosis: Non-recurrent unilateral inguinal hernia without obstruction or gangrene [K40.90]  Post-operative diagnosis Same as pre-operative diagnosis    Procedure: Procedure(s):  ROBOTIC REPAIR OF LEFT INGUINAL HERNIA WITH PLACEMENT OF DEXTILE  (ANATOMICAL MESH PRE SHAPED MONOFILAMENT POLYPROPYLENE TEXTILE WITH MARKING) LEFT SIDE   Surgeon: Surgeon(s) and Role:     * Mary Hercules MD - Primary     * Tianna Gonzalez PA-C - Assisting  Anesthesia: General   Estimated blood loss: 10 ml  Drains: None  Specimens: * No specimens in log *  Findings:   Moderate sized left indirect inguinal hernia repaired with sac reduction and placement of large Dextile pre-peritoneal mesh.  Complications: None.  Implants:   Implant Name Type Inv. Item Serial No.  Lot No. LRB No. Used Action   DEXTILE  (ANATOMICAL MESH PRE SHAPED MONOFILAMENT POLYPROPYLENE TEXTILE WITH MARKING) LEFT SIDE Mesh   [employer] [id number] Left 1 Implanted       Mary Hercules MD

## 2020-03-12 NOTE — DISCHARGE INSTRUCTIONS
Same Day Surgery Discharge Instructions for  Sedation and General Anesthesia       It's not unusual to feel dizzy, light-headed or faint for up to 24 hours after surgery or while taking pain medication.  If you have these symptoms: sit for a few minutes before standing and have someone assist you when you get up to walk or use the bathroom.      You should rest and relax for the next 24 hours. We recommend you make arrangements to have an adult stay with you for at least 24 hours after your discharge.  Avoid hazardous and strenuous activity.      DO NOT DRIVE any vehicle or operate mechanical equipment for 24 hours following the end of your surgery.  Even though you may feel normal, your reactions may be affected by the medication you have received.      Do not drink alcoholic beverages for 24 hours following surgery.       Slowly progress to your regular diet as you feel able. It's not unusual to feel nauseated and/or vomit after receiving anesthesia.  If you develop these symptoms, drink clear liquids (apple juice, ginger ale, broth, 7-up, etc. ) until you feel better.  If your nausea and vomiting persists for 24 hours, please notify your surgeon.        All narcotic pain medications, along with inactivity and anesthesia, can cause constipation. Drinking plenty of liquids and increasing fiber intake will help.      For any questions of a medical nature, call your surgeon.      Do not make important decisions for 24 hours.      If you had general anesthesia, you may have a sore throat for a couple of days related to the breathing tube used during surgery.  You may use Cepacol lozenges to help with this discomfort.  If it worsens or if you develop a fever, contact your surgeon.       If you feel your pain is not well managed with the pain medications prescribed by your surgeon, please contact your surgeon's office to let them know so they can address your concerns.           [facility]  [facility]  Discharge Instructions: Post-Operative Robotic Inguinal Hernia    ACTIVITY    Take frequent, short walks and increase your activity gradually.      Avoid strenuous physical activity or heavy lifting greater than 15 lbs. for 2 weeks.  You may climb stairs.    You may drive without restrictions when you are not using any prescription pain medication and feel comfortable in a car.    You may return to work/school when you are comfortable without any prescription pain medication.    WOUND CARE    You may remove your outer dressing or Band-Aids and shower 48 hours after the surgery.  Pat your incisions dry and leave them open to air.  Re-apply dressing (Band-Aids or gauze/tape) as needed for comfort or drainage.    You may have steri-strips (looks like white tape) on your incision.  You may peel off the steri-strips 2 weeks after your surgery if they have not peeled off on their own.     Do not soak your incisions in a tub or pool for 2 weeks.     Do not apply any lotions, creams, or ointments to your incisions.    A ridge under your incisions is normal and will gradually resolve.    DIET    Start with liquids, then gradually resume your regular diet as tolerated.     Drink plenty of fluids to stay hydrated.    PAIN    Expect some tenderness and discomfort at the incision site(s).  Use the prescribed pain medication at your discretion.  Expect gradual resolution of your pain over several days.    You may take ibuprofen with food (unless you have been told not to) instead of or in addition to your prescribed pain medication.  If you are taking Norco or Percocet, do not take any additional acetaminophen/APAP/Tylenol.    Do not drink alcohol or drive while you are taking pain medications.    You may apply ice to your incisions in 20 minute intervals as needed for the next 48 hours.  After that time, consider switching to heat if you prefer.    EXPECTATIONS    ***You may notice air in your scrotum and/or penis  after the surgery.  This is due to the gas used during the surgery.  You can expect some swelling and bruising involving the scrotum and/or penis as well as numbness.  These symptoms are expected and should gradually resolve in the next few days.  You may use ice to help with the swelling and try placing a rolled hand towel below your scrotum to help alleviate scrotal discomfort.  If you develop significant testicular or penile pain, please call our office and speak with a nurse.    Pain medications can cause constipation.  Limit use when possible.  Take over the counter stool softener/stimulant, such as Colace or Senna, 1-2 times a day with plenty of water.  You may take a mild over the counter laxative, such as Miralax or a suppository, as needed.  ***You may take 1 oz. (2 tablespoons) Milk of Magnesia the evening following surgery to encourage bowel movement.  You may discontinue these medications once you are having regular bowel movements and/or are no longer taking your narcotic pain medication.     You may have shoulder or upper back discomfort due to the gas used in surgery.  This is temporary and should resolve in 48-72 hours.  Short, frequent walks may help with this.    FOLLOW UP    You will be scheduled for a 2-3 week post-op visit     If you have any questions or concerns, or would like to be seen sooner, please call us at [phone] and ask to speak with our nurse.  We are located at [address] [place] [address].    CALL OUR OFFICE [phone] IF YOU HAVE:     Chills or fever above 101 F.    Increased redness, warmth, or drainage at your incisions.    Significant bleeding.    Pain not relieved by your pain medication or rest.    Increasing pain after the first 48 hours.    Any other concerns or questions.    Revised January 2018      PLEASE CALL DR. SEALS [phone] WITH ANY QUESTIONS OR CONCERNS

## 2020-03-23 ENCOUNTER — TELEPHONE (OUTPATIENT)
Dept: SURGERY | Facility: CLINIC | Age: 60
End: 2020-03-23

## 2020-03-23 NOTE — TELEPHONE ENCOUNTER
[facility]  Post op call note     Artis Kuhn was called for an update regarding his recovery.  He underwent a robotic bilateral inguinal hernia repair by Dr. Hercules on 3/12/20.  Today he tells me he is doing well and denies any complaints.  He states his wounds are healing well. He is eating a normal diet and his bowels are regular.      He was instructed to continue to keep his wounds clean and dry and gradually resume all normal activities. The patient states all of his questions were answered and he agrees to follow up in clinic as needed.      Tianna Gonzalez PA-C

## (undated) DEVICE — SU VICRYL 4-0 PS-2 18" UND J496H

## (undated) DEVICE — DAVINCI XI OBTURATOR BLADELESS 8MM 470359

## (undated) DEVICE — SU SILK 3-0 SH 30" K832H

## (undated) DEVICE — SU STRATAFIX PDS PLUS 3-0 SPIRAL SH 15CM SXPP1B420

## (undated) DEVICE — DAVINCI XI MONOPOLAR SCISSORS HOT SHEARS 8MM 470179

## (undated) DEVICE — BLADE CLIPPER 4406

## (undated) DEVICE — GLOVE PROTEXIS W/NEU-THERA 6.5  2D73TE65

## (undated) DEVICE — CATH TRAY FOLEY 16FR BARDEX W/DRAIN BAG STATLOCK 300316A

## (undated) DEVICE — ESU GROUND PAD UNIVERSAL W/O CORD

## (undated) DEVICE — PREP CHLORAPREP 26ML TINTED ORANGE  260815

## (undated) DEVICE — LIGHT HANDLE X2

## (undated) DEVICE — DRSG TEGADERM 2 1/2X 2 3/4"

## (undated) DEVICE — DAVINCI XI NDL DRIVER MEGA SUTURE CUT 8MM 470309

## (undated) DEVICE — DAVINCI XI FCP BIPOLAR FENESTRATED 470205

## (undated) DEVICE — DRAPE SHEET REV FOLD 3/4 9349

## (undated) DEVICE — SU VICRYL 3-0 SH 27" J316H

## (undated) DEVICE — BLADE KNIFE SURG 11 371111

## (undated) DEVICE — DAVINCI HOT SHEARS TIP COVER  400180

## (undated) DEVICE — ENDO TROCAR FIRST ENTRY KII FIOS Z-THRD 05X100MM CTF03

## (undated) DEVICE — LUBRICANT INST ELECTROLUBE EL101

## (undated) DEVICE — SU STRATAFIX PDS PLUS CT-1 15CM SXPP1A420

## (undated) DEVICE — SOL WATER IRRIG 1000ML BOTTLE 2F7114

## (undated) DEVICE — DAVINCI XI SEAL UNIVERSAL 5-8MM 470361

## (undated) DEVICE — DAVINCI XI DRAPE COLUMN 470341

## (undated) DEVICE — DAVINCI XI DRAPE ARM 470015

## (undated) DEVICE — LINEN TOWEL PACK X5 5464

## (undated) DEVICE — SYSTEM CLEARIFY VISUALIZATION 21-345

## (undated) DEVICE — PACK LAP CHOLE SLC15LCFSD

## (undated) DEVICE — ESU PENCIL W/HOLSTER E2350H

## (undated) RX ORDER — OXYCODONE HYDROCHLORIDE 5 MG/1
TABLET ORAL
Status: DISPENSED
Start: 2020-03-12

## (undated) RX ORDER — BUPIVACAINE HYDROCHLORIDE AND EPINEPHRINE 5; 5 MG/ML; UG/ML
INJECTION, SOLUTION EPIDURAL; INTRACAUDAL; PERINEURAL
Status: DISPENSED
Start: 2020-03-12

## (undated) RX ORDER — PROPOFOL 10 MG/ML
INJECTION, EMULSION INTRAVENOUS
Status: DISPENSED
Start: 2020-03-12

## (undated) RX ORDER — LABETALOL HYDROCHLORIDE 5 MG/ML
INJECTION, SOLUTION INTRAVENOUS
Status: DISPENSED
Start: 2020-03-12

## (undated) RX ORDER — HEPARIN SODIUM 5000 [USP'U]/.5ML
INJECTION, SOLUTION INTRAVENOUS; SUBCUTANEOUS
Status: DISPENSED
Start: 2020-03-12

## (undated) RX ORDER — DEXAMETHASONE SODIUM PHOSPHATE 4 MG/ML
INJECTION, SOLUTION INTRA-ARTICULAR; INTRALESIONAL; INTRAMUSCULAR; INTRAVENOUS; SOFT TISSUE
Status: DISPENSED
Start: 2020-03-12

## (undated) RX ORDER — HYDROMORPHONE HYDROCHLORIDE 1 MG/ML
INJECTION, SOLUTION INTRAMUSCULAR; INTRAVENOUS; SUBCUTANEOUS
Status: DISPENSED
Start: 2020-03-12

## (undated) RX ORDER — CEFAZOLIN SODIUM 2 G/100ML
INJECTION, SOLUTION INTRAVENOUS
Status: DISPENSED
Start: 2020-03-12

## (undated) RX ORDER — FENTANYL CITRATE 0.05 MG/ML
INJECTION, SOLUTION INTRAMUSCULAR; INTRAVENOUS
Status: DISPENSED
Start: 2020-03-12

## (undated) RX ORDER — LIDOCAINE HYDROCHLORIDE 20 MG/ML
INJECTION, SOLUTION EPIDURAL; INFILTRATION; INTRACAUDAL; PERINEURAL
Status: DISPENSED
Start: 2020-03-12

## (undated) RX ORDER — FENTANYL CITRATE 50 UG/ML
INJECTION, SOLUTION INTRAMUSCULAR; INTRAVENOUS
Status: DISPENSED
Start: 2018-08-30

## (undated) RX ORDER — FENTANYL CITRATE 50 UG/ML
INJECTION, SOLUTION INTRAMUSCULAR; INTRAVENOUS
Status: DISPENSED
Start: 2020-03-12

## (undated) RX ORDER — ONDANSETRON 2 MG/ML
INJECTION INTRAMUSCULAR; INTRAVENOUS
Status: DISPENSED
Start: 2020-03-12